# Patient Record
Sex: FEMALE | URBAN - METROPOLITAN AREA
[De-identification: names, ages, dates, MRNs, and addresses within clinical notes are randomized per-mention and may not be internally consistent; named-entity substitution may affect disease eponyms.]

---

## 2017-01-03 ENCOUNTER — OUTPATIENT (OUTPATIENT)
Dept: OUTPATIENT SERVICES | Facility: HOSPITAL | Age: 27
LOS: 1 days | End: 2017-01-03
Payer: SELF-PAY

## 2017-01-03 ENCOUNTER — MED ADMIN CHARGE (OUTPATIENT)
Age: 27
End: 2017-01-03

## 2017-01-03 ENCOUNTER — RESULT CHARGE (OUTPATIENT)
Age: 27
End: 2017-01-03

## 2017-01-03 ENCOUNTER — APPOINTMENT (OUTPATIENT)
Dept: FAMILY MEDICINE | Facility: HOSPITAL | Age: 27
End: 2017-01-03

## 2017-01-03 VITALS
HEIGHT: 60 IN | WEIGHT: 132 LBS | HEART RATE: 77 BPM | SYSTOLIC BLOOD PRESSURE: 111 MMHG | OXYGEN SATURATION: 99 % | RESPIRATION RATE: 16 BRPM | TEMPERATURE: 97.8 F | BODY MASS INDEX: 25.91 KG/M2 | DIASTOLIC BLOOD PRESSURE: 75 MMHG

## 2017-01-03 DIAGNOSIS — Z00.00 ENCOUNTER FOR GENERAL ADULT MEDICAL EXAMINATION W/OUT ABNORMAL FINDINGS: ICD-10-CM

## 2017-01-03 DIAGNOSIS — Z11.3 ENCOUNTER FOR SCREENING FOR INFECTIONS WITH A PREDOMINANTLY SEXUAL MODE OF TRANSMISSION: ICD-10-CM

## 2017-01-03 DIAGNOSIS — Z83.3 FAMILY HISTORY OF DIABETES MELLITUS: ICD-10-CM

## 2017-01-03 PROCEDURE — G0008: CPT

## 2017-01-03 PROCEDURE — 87491 CHLMYD TRACH DNA AMP PROBE: CPT

## 2017-01-03 PROCEDURE — 87591 N.GONORRHOEAE DNA AMP PROB: CPT

## 2017-01-03 PROCEDURE — G0463: CPT

## 2017-01-04 LAB
HCG UR QL: NEGATIVE
QUALITY CONTROL: YES

## 2017-01-09 ENCOUNTER — FORM ENCOUNTER (OUTPATIENT)
Age: 27
End: 2017-01-09

## 2017-01-10 ENCOUNTER — OUTPATIENT (OUTPATIENT)
Dept: OUTPATIENT SERVICES | Facility: HOSPITAL | Age: 27
LOS: 1 days | End: 2017-01-10
Payer: SELF-PAY

## 2017-01-10 PROCEDURE — 76856 US EXAM PELVIC COMPLETE: CPT | Mod: 26

## 2017-01-10 PROCEDURE — 76830 TRANSVAGINAL US NON-OB: CPT

## 2017-01-10 PROCEDURE — 76830 TRANSVAGINAL US NON-OB: CPT | Mod: 26

## 2017-01-10 PROCEDURE — 80061 LIPID PANEL: CPT

## 2017-01-10 PROCEDURE — 83036 HEMOGLOBIN GLYCOSYLATED A1C: CPT

## 2017-01-10 PROCEDURE — 87389 HIV-1 AG W/HIV-1&-2 AB AG IA: CPT

## 2017-01-10 PROCEDURE — 76856 US EXAM PELVIC COMPLETE: CPT

## 2017-01-10 PROCEDURE — 80053 COMPREHEN METABOLIC PANEL: CPT

## 2017-01-10 PROCEDURE — 36415 COLL VENOUS BLD VENIPUNCTURE: CPT

## 2017-01-20 ENCOUNTER — APPOINTMENT (OUTPATIENT)
Dept: FAMILY MEDICINE | Facility: HOSPITAL | Age: 27
End: 2017-01-20

## 2017-01-25 ENCOUNTER — RESULT REVIEW (OUTPATIENT)
Age: 27
End: 2017-01-25

## 2017-01-26 ENCOUNTER — APPOINTMENT (OUTPATIENT)
Dept: FAMILY MEDICINE | Facility: HOSPITAL | Age: 27
End: 2017-01-26

## 2017-01-26 ENCOUNTER — OUTPATIENT (OUTPATIENT)
Dept: OUTPATIENT SERVICES | Facility: HOSPITAL | Age: 27
LOS: 1 days | End: 2017-01-26
Payer: SELF-PAY

## 2017-01-26 VITALS
RESPIRATION RATE: 16 BRPM | SYSTOLIC BLOOD PRESSURE: 108 MMHG | TEMPERATURE: 97.88 F | OXYGEN SATURATION: 99 % | WEIGHT: 139 LBS | DIASTOLIC BLOOD PRESSURE: 69 MMHG | HEART RATE: 84 BPM

## 2017-01-26 PROCEDURE — 88175 CYTOPATH C/V AUTO FLUID REDO: CPT

## 2017-01-26 PROCEDURE — G0463: CPT

## 2017-02-03 ENCOUNTER — CHART COPY (OUTPATIENT)
Age: 27
End: 2017-02-03

## 2017-02-08 ENCOUNTER — APPOINTMENT (OUTPATIENT)
Dept: FAMILY MEDICINE | Facility: HOSPITAL | Age: 27
End: 2017-02-08

## 2017-02-08 VITALS
HEIGHT: 60 IN | HEART RATE: 75 BPM | BODY MASS INDEX: 27.68 KG/M2 | SYSTOLIC BLOOD PRESSURE: 109 MMHG | RESPIRATION RATE: 16 BRPM | OXYGEN SATURATION: 99 % | WEIGHT: 141 LBS | DIASTOLIC BLOOD PRESSURE: 63 MMHG | TEMPERATURE: 208.4 F

## 2017-02-08 DIAGNOSIS — Z23 ENCOUNTER FOR IMMUNIZATION: ICD-10-CM

## 2017-02-14 ENCOUNTER — RESULT REVIEW (OUTPATIENT)
Age: 27
End: 2017-02-14

## 2017-02-14 LAB — CYTOLOGY CVX/VAG DOC THIN PREP: NORMAL

## 2017-02-15 ENCOUNTER — OUTPATIENT (OUTPATIENT)
Dept: OUTPATIENT SERVICES | Facility: HOSPITAL | Age: 27
LOS: 1 days | End: 2017-02-15
Payer: SELF-PAY

## 2017-02-15 ENCOUNTER — APPOINTMENT (OUTPATIENT)
Dept: FAMILY MEDICINE | Facility: HOSPITAL | Age: 27
End: 2017-02-15

## 2017-02-15 VITALS
OXYGEN SATURATION: 98 % | BODY MASS INDEX: 27.88 KG/M2 | SYSTOLIC BLOOD PRESSURE: 107 MMHG | WEIGHT: 142 LBS | TEMPERATURE: 207.68 F | RESPIRATION RATE: 14 BRPM | HEART RATE: 74 BPM | HEIGHT: 60 IN | DIASTOLIC BLOOD PRESSURE: 70 MMHG

## 2017-02-15 DIAGNOSIS — Z97.5 PRESENCE OF (INTRAUTERINE) CONTRACEPTIVE DEVICE: ICD-10-CM

## 2017-02-15 DIAGNOSIS — Z12.4 ENCOUNTER FOR SCREENING FOR MALIGNANT NEOPLASM OF CERVIX: ICD-10-CM

## 2017-02-15 PROCEDURE — 88175 CYTOPATH C/V AUTO FLUID REDO: CPT

## 2017-02-15 PROCEDURE — G0463: CPT

## 2017-02-26 ENCOUNTER — FORM ENCOUNTER (OUTPATIENT)
Age: 27
End: 2017-02-26

## 2017-02-27 ENCOUNTER — OUTPATIENT (OUTPATIENT)
Dept: OUTPATIENT SERVICES | Facility: HOSPITAL | Age: 27
LOS: 1 days | End: 2017-02-27
Payer: SELF-PAY

## 2017-02-27 PROCEDURE — 76856 US EXAM PELVIC COMPLETE: CPT | Mod: 26

## 2017-02-27 PROCEDURE — 76856 US EXAM PELVIC COMPLETE: CPT

## 2017-02-27 PROCEDURE — 76830 TRANSVAGINAL US NON-OB: CPT | Mod: 26

## 2017-02-27 PROCEDURE — 76830 TRANSVAGINAL US NON-OB: CPT

## 2017-03-13 LAB — CYTOLOGY CVX/VAG DOC THIN PREP: NORMAL

## 2017-08-09 ENCOUNTER — APPOINTMENT (OUTPATIENT)
Dept: FAMILY MEDICINE | Facility: HOSPITAL | Age: 27
End: 2017-08-09

## 2018-03-21 ENCOUNTER — APPOINTMENT (OUTPATIENT)
Dept: ANTEPARTUM | Facility: CLINIC | Age: 28
End: 2018-03-21

## 2018-03-28 ENCOUNTER — ASOB RESULT (OUTPATIENT)
Age: 28
End: 2018-03-28

## 2018-03-28 ENCOUNTER — APPOINTMENT (OUTPATIENT)
Dept: ANTEPARTUM | Facility: CLINIC | Age: 28
End: 2018-03-28
Payer: SELF-PAY

## 2018-03-28 PROCEDURE — 76857 US EXAM PELVIC LIMITED: CPT

## 2018-03-28 PROCEDURE — 76830 TRANSVAGINAL US NON-OB: CPT

## 2018-04-18 ENCOUNTER — EMERGENCY (EMERGENCY)
Facility: HOSPITAL | Age: 28
LOS: 1 days | Discharge: HOME | End: 2018-04-18
Attending: EMERGENCY MEDICINE
Payer: MEDICAID

## 2018-04-18 VITALS
OXYGEN SATURATION: 99 % | HEART RATE: 97 BPM | DIASTOLIC BLOOD PRESSURE: 70 MMHG | SYSTOLIC BLOOD PRESSURE: 122 MMHG | RESPIRATION RATE: 16 BRPM

## 2018-04-18 VITALS — HEIGHT: 59.06 IN | WEIGHT: 149.91 LBS

## 2018-04-18 LAB
ALBUMIN SERPL ELPH-MCNC: 4.4 G/DL — SIGNIFICANT CHANGE UP (ref 3.3–5.2)
ALP SERPL-CCNC: 108 U/L — SIGNIFICANT CHANGE UP (ref 40–120)
ALT FLD-CCNC: 18 U/L — SIGNIFICANT CHANGE UP
ANION GAP SERPL CALC-SCNC: 14 MMOL/L — SIGNIFICANT CHANGE UP (ref 5–17)
APPEARANCE UR: CLEAR — SIGNIFICANT CHANGE UP
AST SERPL-CCNC: 23 U/L — SIGNIFICANT CHANGE UP
BACTERIA # UR AUTO: ABNORMAL
BILIRUB SERPL-MCNC: 0.4 MG/DL — SIGNIFICANT CHANGE UP (ref 0.4–2)
BILIRUB UR-MCNC: NEGATIVE — SIGNIFICANT CHANGE UP
BUN SERPL-MCNC: 10 MG/DL — SIGNIFICANT CHANGE UP (ref 8–20)
CALCIUM SERPL-MCNC: 9.7 MG/DL — SIGNIFICANT CHANGE UP (ref 8.6–10.2)
CHLORIDE SERPL-SCNC: 97 MMOL/L — LOW (ref 98–107)
CO2 SERPL-SCNC: 26 MMOL/L — SIGNIFICANT CHANGE UP (ref 22–29)
COLOR SPEC: YELLOW — SIGNIFICANT CHANGE UP
CREAT SERPL-MCNC: 0.79 MG/DL — SIGNIFICANT CHANGE UP (ref 0.5–1.3)
DIFF PNL FLD: ABNORMAL
EPI CELLS # UR: SIGNIFICANT CHANGE UP
GLUCOSE SERPL-MCNC: 102 MG/DL — SIGNIFICANT CHANGE UP (ref 70–115)
GLUCOSE UR QL: NEGATIVE MG/DL — SIGNIFICANT CHANGE UP
HCG UR QL: NEGATIVE — SIGNIFICANT CHANGE UP
HCT VFR BLD CALC: 39.8 % — SIGNIFICANT CHANGE UP (ref 37–47)
HGB BLD-MCNC: 13 G/DL — SIGNIFICANT CHANGE UP (ref 12–16)
KETONES UR-MCNC: NEGATIVE — SIGNIFICANT CHANGE UP
LEUKOCYTE ESTERASE UR-ACNC: ABNORMAL
MCHC RBC-ENTMCNC: 28.6 PG — SIGNIFICANT CHANGE UP (ref 27–31)
MCHC RBC-ENTMCNC: 32.7 G/DL — SIGNIFICANT CHANGE UP (ref 32–36)
MCV RBC AUTO: 87.5 FL — SIGNIFICANT CHANGE UP (ref 81–99)
NITRITE UR-MCNC: NEGATIVE — SIGNIFICANT CHANGE UP
PH UR: 6.5 — SIGNIFICANT CHANGE UP (ref 5–8)
PLATELET # BLD AUTO: 250 K/UL — SIGNIFICANT CHANGE UP (ref 150–400)
POTASSIUM SERPL-MCNC: 3.8 MMOL/L — SIGNIFICANT CHANGE UP (ref 3.5–5.3)
POTASSIUM SERPL-SCNC: 3.8 MMOL/L — SIGNIFICANT CHANGE UP (ref 3.5–5.3)
PROT SERPL-MCNC: 8.5 G/DL — SIGNIFICANT CHANGE UP (ref 6.6–8.7)
PROT UR-MCNC: NEGATIVE MG/DL — SIGNIFICANT CHANGE UP
RBC # BLD: 4.55 M/UL — SIGNIFICANT CHANGE UP (ref 4.4–5.2)
RBC # FLD: 14.1 % — SIGNIFICANT CHANGE UP (ref 11–15.6)
RBC CASTS # UR COMP ASSIST: SIGNIFICANT CHANGE UP /HPF (ref 0–4)
SODIUM SERPL-SCNC: 137 MMOL/L — SIGNIFICANT CHANGE UP (ref 135–145)
SP GR SPEC: 1 — LOW (ref 1.01–1.02)
UROBILINOGEN FLD QL: NEGATIVE MG/DL — SIGNIFICANT CHANGE UP
WBC # BLD: 6.2 K/UL — SIGNIFICANT CHANGE UP (ref 4.8–10.8)
WBC # FLD AUTO: 6.2 K/UL — SIGNIFICANT CHANGE UP (ref 4.8–10.8)
WBC UR QL: SIGNIFICANT CHANGE UP

## 2018-04-18 PROCEDURE — 99284 EMERGENCY DEPT VISIT MOD MDM: CPT

## 2018-04-18 PROCEDURE — 99283 EMERGENCY DEPT VISIT LOW MDM: CPT

## 2018-04-18 PROCEDURE — 36415 COLL VENOUS BLD VENIPUNCTURE: CPT

## 2018-04-18 PROCEDURE — 81001 URINALYSIS AUTO W/SCOPE: CPT

## 2018-04-18 PROCEDURE — 85027 COMPLETE CBC AUTOMATED: CPT

## 2018-04-18 PROCEDURE — 81025 URINE PREGNANCY TEST: CPT

## 2018-04-18 PROCEDURE — 80053 COMPREHEN METABOLIC PANEL: CPT

## 2018-04-18 PROCEDURE — T1013: CPT

## 2018-04-18 PROCEDURE — 87086 URINE CULTURE/COLONY COUNT: CPT

## 2018-04-18 RX ORDER — ONDANSETRON 8 MG/1
4 TABLET, FILM COATED ORAL ONCE
Qty: 0 | Refills: 0 | Status: COMPLETED | OUTPATIENT
Start: 2018-04-18 | End: 2018-04-18

## 2018-04-18 RX ORDER — SODIUM CHLORIDE 9 MG/ML
1000 INJECTION INTRAMUSCULAR; INTRAVENOUS; SUBCUTANEOUS ONCE
Qty: 0 | Refills: 0 | Status: COMPLETED | OUTPATIENT
Start: 2018-04-18 | End: 2018-04-18

## 2018-04-18 RX ORDER — MECLIZINE HCL 12.5 MG
25 TABLET ORAL ONCE
Qty: 0 | Refills: 0 | Status: COMPLETED | OUTPATIENT
Start: 2018-04-18 | End: 2018-04-18

## 2018-04-18 RX ORDER — IBUPROFEN 200 MG
1 TABLET ORAL
Qty: 30 | Refills: 0 | OUTPATIENT
Start: 2018-04-18 | End: 2018-04-22

## 2018-04-18 RX ADMIN — Medication 25 MILLIGRAM(S): at 15:22

## 2018-04-18 RX ADMIN — SODIUM CHLORIDE 1000 MILLILITER(S): 9 INJECTION INTRAMUSCULAR; INTRAVENOUS; SUBCUTANEOUS at 15:22

## 2018-04-18 RX ADMIN — ONDANSETRON 4 MILLIGRAM(S): 8 TABLET, FILM COATED ORAL at 15:22

## 2018-04-18 NOTE — ED ADULT NURSE NOTE - OBJECTIVE STATEMENT
Assumed pt care at 1440.  Pt awake, alert and oriented x3 c/o headache, nausea, dizziness and pain upon closing eyes.  Pt states she may be pregnant.  Abdomen soft, nontender, nondistended.  Skin warm and dry.  Neuro intact.  SIERRA well and with purpose.

## 2018-04-18 NOTE — ED PROVIDER NOTE - CARE PLAN
Assessment and plan of treatment:	evaluation of HA, dizziness,   UA, Urine pregnancy, CBC, Fluids, meclizine, zofran  reassess. Principal Discharge DX:	Acute nonintractable headache, unspecified headache type  Assessment and plan of treatment:	evaluation of HA, dizziness,   UA, Urine pregnancy, CBC, Fluids, meclizine, zofran  reassess.

## 2018-04-18 NOTE — ED ADULT TRIAGE NOTE - CHIEF COMPLAINT QUOTE
Pt c/o HA and dizziness since yesterday. Reports subjective fevers. Denies any sick contacts at home.

## 2018-04-18 NOTE — ED PROVIDER NOTE - ENMT, MLM
I want to leave Airway patent, Nasal mucosa clear. Mouth with normal mucosa. Throat has no vesicles, no oropharyngeal exudates and uvula is midline.

## 2018-04-20 LAB
CULTURE RESULTS: SIGNIFICANT CHANGE UP
SPECIMEN SOURCE: SIGNIFICANT CHANGE UP

## 2018-12-01 NOTE — ED PROVIDER NOTE - OBJECTIVE STATEMENT
27 year old female with a hx of preeclampsia in pregnancy presenting with HA, dizziness, and chills (subjective fever, no actual temperature taken). HA described as a strong pain to the back of her head, behind her ears and her eyes. HA is associated with a dizziness that she describes as occurring when she blinks her eyes and as the room spinning. dizziness makes her nauseous but she denies vomiting. pt states that she has blurry vision, denies double vision Pt states that she had similar symptoms when she had preeclampsia. denies recent sick contacts, recent illness, abdominal pains, dysuria, hematuria, vomiting, diarrhea. LMP in the beginning of march (exact date unknown). PT states that she had an IUD placed in 2016 that is placed incorrectly and that there could be a chance that she is pregnant.
Yes

## 2019-07-29 PROBLEM — O14.90 UNSPECIFIED PRE-ECLAMPSIA, UNSPECIFIED TRIMESTER: Chronic | Status: ACTIVE | Noted: 2018-04-18

## 2019-08-02 ENCOUNTER — APPOINTMENT (OUTPATIENT)
Dept: ORTHOPEDIC SURGERY | Facility: CLINIC | Age: 29
End: 2019-08-02

## 2019-08-28 ENCOUNTER — APPOINTMENT (OUTPATIENT)
Age: 29
End: 2019-08-28
Payer: COMMERCIAL

## 2019-08-28 VITALS — HEART RATE: 64 BPM | DIASTOLIC BLOOD PRESSURE: 70 MMHG | SYSTOLIC BLOOD PRESSURE: 107 MMHG

## 2019-08-28 DIAGNOSIS — M75.41 IMPINGEMENT SYNDROME OF RIGHT SHOULDER: ICD-10-CM

## 2019-08-28 DIAGNOSIS — M75.81 OTHER SHOULDER LESIONS, RIGHT SHOULDER: ICD-10-CM

## 2019-08-28 PROCEDURE — 99203 OFFICE O/P NEW LOW 30 MIN: CPT

## 2019-08-28 PROCEDURE — 73030 X-RAY EXAM OF SHOULDER: CPT | Mod: RT

## 2019-08-28 NOTE — CONSULT LETTER
[Dear  ___] : Dear  [unfilled], [Consult Letter:] : I had the pleasure of evaluating your patient, [unfilled]. [Please see my note below.] : Please see my note below. [Consult Closing:] : Thank you very much for allowing me to participate in the care of this patient.  If you have any questions, please do not hesitate to contact me. [Sincerely,] : Sincerely, [FreeTextEntry2] : Ivy Beasley MD [FreeTextEntry3] : Ry Le DO.\par Sports Medicine \par \par Orthopaedic Surgery\par \par [facility] [place] [facility]\par \par [address] \par [address]\[place] [address]\par \par [address] [facility] \[address] \[place] [address]\par \par Tel [phone]\par Fax [phone]\par \par

## 2019-08-28 NOTE — REASON FOR VISIT
[Initial Visit] : an initial visit for [[employer] ] : provided by [employer]   [TWNoteComboBox1] : Italian [FreeTextEntry2] : Sanchez

## 2019-08-28 NOTE — PHYSICAL EXAM
[de-identified] : Physical Exam:\par General: Well appearing, no acute distress, A&O\par Neurologic: A&Ox3, No focal deficits\par Head: NCAT without abrasions, lacerations, or ecchymosis to head, face, or scalp\par Eyes: No scleral icterus, no gross abnormalities\par Respiratory: Equal chest wall expansion bilaterally, no accessory muscle use\par Lymphatic: No lymphadenopathy palpated\par Skin: Warm and dry\par Psychiatric: Normal mood and affect\par Right Shoulder\par ·	Inspection/Palpation: no tenderness, swelling or deformities\par ·	Range of Motion: no crepitus with ROM; Active ; ER at side 35; IR to L1; Passive FF same\par ·	Strength: forward elevation in scapular plane [4/5], internal rotation [4/5], external rotation [4/5], adduction [4/5] and abduction [4/5]\par ·	Stability: no joint instability on provocative testing\par ·	Tests: Elaine test positive, Neer positive, positive drop arm test secondary to pain, bear hug test positive, Napolean sign negative, cross arm adduction negative, lift off sign positive, hornblowers sign negative, speeds test negative, Yergason's test negative, no bicipital groove tenderness, Noel's Active Compression test negative, whipple test if, bicep's load II test negative\par \par Left Shoulder\par ·	Inspection/Palpation: no tenderness, swelling or deformities\par ·	Range of Motion: full and painless in all planes, no crepitus\par ·	Strength: forward elevation in scapular plane 5/5, internal rotation 5/5, external rotation 5/5, adduction 5/5 and abduction 5/5\par ·	Stability: no joint instability on provocative testing\par Tests: Elaine test negative, Neer sign negative, negative drop arm test secondary to pain, bear hug test negative, Napolean sign negative, cross arm adduction negative, lift off sign positive, hornblowers sign negative, speeds test negative, Yergason's test negative, no bicipital groove tenderness, Noel's Active Compression test negative [de-identified] : X-rays of the right shoulder were performed today and available for me to review. 4 views of the right shoulder demonstrate no fracture or dislocation. [No] glenohumeral degenerative changes noted. [No] AC joint degenerative changes noted. No gross deformities noted.

## 2019-08-28 NOTE — DISCUSSION/SUMMARY
[de-identified] : The patient presents with acute on chronic right  shoulder pain for one year. The patient has tried conservative measures of treatment including rest. The patient has positive  testing on clinical evaluation that  consistent with impingement, rotator cuff tendinitis, possible radiculopathy. I discussed the role of the rotator cuff in shoulder function including dynamic stability and range of motion, as well as pathology that causes shoulder pain including the proximal biceps tendon, subacromial impingement, bursitis and rotator cuff dysfunction/tendinopathy. On clinical evaluation, I believe that the patient's primary concern is the rotator cuff tendinitis.\par \par \par \par Nonoperative modalities of treatment include physical therapy for range of motion therapy, rotator cuff strengthening/scapular stabilization, NSAID's (if able), and subacromial corticosteroid injection. Surgical intervention would include arthroscopic rotator cuff repair for tears that are not amenable to surgical nonoperative treatment or fail a trial of nonoperative management.\par \par Considering the patient's presentation, I've recommended a trial of nonoperative treatment that includes meloxicam and physical therapy. I gave her a prescription for meloxicam once daily for 21 days and if should her for physical therapy. I also gave her a note that she may continue light duty for the next 4 weeks.. I will see the patient back in 6 weeks to determine if there is any further improvement. At that time if there is no improvement, we'll consider an MRI. We'll see the patient back at that time. If they have any questions or concerns, I can be available at any time for further discussion.\par \par

## 2019-08-28 NOTE — HISTORY OF PRESENT ILLNESS
[2] : a current pain level of 2/10 [de-identified] : LINDSAY is a 28 year female who presents today with right shoulder pain.  Her pain began approximately one year ago.  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  She denies injury or trauma to the UE.  Patient saw her PCP regarding her right shoulder pain and she recommended that the patient see an orthopedist.  Currently, she has pain to the entire shoulder and upper trapezius.  Pain radiates to her elbow.  She endorses weakness and pain if she is in one position for a prolonged period of time.  she denies numbness/tingling to the UE.\par

## 2019-09-04 ENCOUNTER — APPOINTMENT (OUTPATIENT)
Dept: OBGYN | Facility: CLINIC | Age: 29
End: 2019-09-04

## 2019-10-14 ENCOUNTER — APPOINTMENT (OUTPATIENT)
Dept: ORTHOPEDIC SURGERY | Facility: CLINIC | Age: 29
End: 2019-10-14

## 2020-04-30 NOTE — REVIEW OF SYSTEMS
none [Joint Pain] : joint pain [Depression] : depression [Sleep Disturbances] : ~T sleep disturbances [FreeTextEntry9] : right hsoulder

## 2021-07-14 ENCOUNTER — APPOINTMENT (OUTPATIENT)
Dept: ORTHOPEDIC SURGERY | Facility: CLINIC | Age: 31
End: 2021-07-14

## 2021-07-30 ENCOUNTER — APPOINTMENT (OUTPATIENT)
Dept: ORTHOPEDIC SURGERY | Facility: CLINIC | Age: 31
End: 2021-07-30
Payer: COMMERCIAL

## 2021-07-30 VITALS — HEIGHT: 60 IN | WEIGHT: 142 LBS | BODY MASS INDEX: 27.88 KG/M2

## 2021-07-30 DIAGNOSIS — M54.5 LOW BACK PAIN: ICD-10-CM

## 2021-07-30 PROCEDURE — 99214 OFFICE O/P EST MOD 30 MIN: CPT

## 2021-07-30 NOTE — PHYSICAL EXAM
[Poor Appearance] : well-appearing [Acute Distress] : not in acute distress [Obese] : not obese [de-identified] : CONSTITUTIONAL: The patient is a very pleasant individual who is well-nourished and who appears stated age.\par PSYCHIATRIC: The patient is alert and oriented X 3 and in no apparent distress, and participates with orthopedic evaluation well.\par HEAD: Atraumatic and is nonsyndromic in appearance.\par EENT: No visible thyromegaly, EOMI.\par RESPIRATORY: Respiratory rate is regular, not dyspneic on examination.\par LYMPHATICS: There is no inguinal lymphadenopathy\par INTEGUMENTARY: Skin is clean, dry, and intact about the bilateral lower extremities and lumbar spine.\par VASCULAR: There is brisk capillary refill about the bilateral lower extremities.\par NEUROLOGIC: There are no pathologic reflexes. There is no decrease in sensation of the bilateral lower extremities on Wartenberg pinwheel examination. Deep tendon reflexes are well maintained at 2+/4 of the bilateral lower extremities and are symmetric.\par MUSCULOSKELETAL: There is no visible muscular atrophy. Manual motor strength is well maintained in the bilateral lower extremities. Range of motion of lumbar spine is well maintained. The patient ambulates in a non-myelopathic manner. Negative tension sign and straight leg raise bilaterally. Quad extension, ankle dorsiflexion, EHL, plantar flexion, and ankle eversion are well preserved. Normal secondary orthopaedic exam of bilateral hips, greater trochanteric area, knees and ankles. lumbar myositis.  [de-identified] : No Xrays today as patient states she believes she may be pregnant. \par \par  X-rays of the right shoulder were performed 08- and available for me to review. 4 views of the right shoulder demonstrate no fracture or dislocation. [No] glenohumeral degenerative changes noted. [No] AC joint degenerative changes noted. No gross deformities noted.

## 2021-07-30 NOTE — ADDENDUM
[FreeTextEntry1] : Documented by Supa Dickson acting as a scribe for Amy Quezada on 07/30/2021 . All medical record entries made by the Scribe were at my, Amy Quezada, direction and personally dictated by me on 07/30/2021  . I have reviewed the chart and agree that the record accurately reflects my personal performance of the history, physical exam, assessment and plan. I have also personally directed, reviewed, and agreed with the chart.

## 2021-07-30 NOTE — DISCUSSION/SUMMARY
[de-identified] : We talked about the nature of the condition and treatment options. Anticipatory guidance regarding mechanically oriented lower back pain and myositis was given. Use of Tylenol 500 MG 2-3 times daily / PRN was advised. Patient has been referred to physical therapy for decreased pain modalities, stretching and strengthening modalities, soft tissue modalities, and physical modalities. The patient will follow up in 4 - 6 weeks for a repeat clinical assessment If pain persists at this point we will consider ordering an MRI to further assess these complaints. \par \par Prior to appointment and during encounter with patient extensive medical records were reviewed including but not limited to, hospital records, out patient records, imaging results, and lab data. During this appointment the patient was examined, diagnoses were discussed and explained in a face to face manner. In addition extensive time was spent reviewing aforementioned diagnostic studies. Counseling including abnormal image results, differential diagnoses, treatment options, risk and benefits, lifestyle changes, current condition, and current medications was performed. Patient's comments, questions, and concerns were address and patient verbalized understanding. Based on this patient's presentation at our office, which is an orthopedic spine surgeon's office, this patient inherently / intrinsically has a risk, however minute, of developing  issues such as Cauda equina syndrome, bowel and bladder changes, or progression of motor or neurological deficits such as paralysis which may be permanent.

## 2021-07-30 NOTE — HISTORY OF PRESENT ILLNESS
[de-identified] : 30 year old F presents for an initial evaluation of lower back pain radiating into the buttock region. She has had no conservative care. Patient has seen Hand and UE specialists for her right shoulder pain, she was provided a Medrol pack for these complaints.  [Ataxia] : no ataxia [Incontinence] : no incontinence [Loss of Dexterity] : good dexterity [Urinary Ret.] : no urinary retention

## 2021-08-25 ENCOUNTER — APPOINTMENT (OUTPATIENT)
Dept: ORTHOPEDIC SURGERY | Facility: CLINIC | Age: 31
End: 2021-08-25

## 2021-11-01 ENCOUNTER — APPOINTMENT (OUTPATIENT)
Dept: FAMILY MEDICINE | Facility: CLINIC | Age: 31
End: 2021-11-01

## 2021-11-17 ENCOUNTER — APPOINTMENT (OUTPATIENT)
Dept: FAMILY MEDICINE | Facility: CLINIC | Age: 31
End: 2021-11-17

## 2022-04-01 ENCOUNTER — APPOINTMENT (OUTPATIENT)
Dept: ORTHOPEDIC SURGERY | Facility: CLINIC | Age: 32
End: 2022-04-01
Payer: MEDICAID

## 2022-04-01 VITALS
HEART RATE: 72 BPM | SYSTOLIC BLOOD PRESSURE: 111 MMHG | BODY MASS INDEX: 31.41 KG/M2 | WEIGHT: 160 LBS | HEIGHT: 60 IN | DIASTOLIC BLOOD PRESSURE: 74 MMHG

## 2022-04-01 DIAGNOSIS — M25.511 PAIN IN RIGHT SHOULDER: ICD-10-CM

## 2022-04-01 PROCEDURE — 99213 OFFICE O/P EST LOW 20 MIN: CPT

## 2022-04-01 PROCEDURE — 73030 X-RAY EXAM OF SHOULDER: CPT | Mod: RT

## 2022-04-01 NOTE — HISTORY OF PRESENT ILLNESS
[Pain Location] : pain [] : right shoulder [Worsening] : worsening [Lifting] : lifting [Intermit.] : ~He/She~ states the symptoms seem to be intermittent [de-identified] : 4/1/2022: Yulia is a 31-year-old right-hand-dominant female who presents to the office today complaining of chronic right shoulder pain.  Patient states that her pain began many years ago but she denies any history of trauma.  The pain is located at her right shoulder and also extends up into the right side of her neck as well as down into her right chest.  The pain is primarily activity related and alleviated by rest but also bothers her to lay on her right side.  She has seen Dr. Le in the past who recommended conservative treatments for rotator cuff/biceps tendinitis and bursitis.  Patient underwent physical therapy which did not provide her with any lasting relief.  She takes occasional NSAIDs over-the-counter which also are not very helpful.  She presents today seeking further treatment advice.  The patient denies any fevers, chills, sweats, recent illnesses, numbness, tingling, weakness, or pain elsewhere at this time.

## 2022-04-01 NOTE — DISCUSSION/SUMMARY
[de-identified] : Assessment: 31-year-old female with right shoulder pain secondary to AC joint synovitis\par \par Plan: I had a long discussion with the patient today regarding the nature of their diagnosis and treatment plan. We discussed the risks and benefits of no treatment as well as nonoperative and operative treatments.  I reviewed the patient's x-rays today with her in the office which do demonstrate a very small calcium deposit at the greater tuberosity.  Her examination is most consistent with AC joint pain given that she has significant tenderness over that area.  She also has muscle spasm in the neck and trapezius muscles.  The patient has undergone extensive conservative treatment for this issue in the past including physical therapy which did not provide her with any relief.  I recommend an MRI to further evaluate her shoulders to rule out any internal derangement.  She may continue to treat herself symptomatically on her own at home.  Prescriptions for a Medrol Dosepak as well as meloxicam were sent to her pharmacy today.  I reviewed the risks and benefits associated with these medicines.  Recommend follow-up after the MRI to discuss the results in person and and any further recommendations.\par \par The patient verbalizes their understanding and agrees with the plan.  All questions were answered to their satisfaction.

## 2022-04-01 NOTE — CONSULT LETTER
[Dear  ___] : Dear  [unfilled], [Consult Letter:] : I had the pleasure of evaluating your patient, [unfilled]. [( Thank you for referring [unfilled] for consultation for _____ )] : Thank you for referring [unfilled] for consultation for [unfilled] [Please see my note below.] : Please see my note below. [Consult Closing:] : Thank you very much for allowing me to participate in the care of this patient.  If you have any questions, please do not hesitate to contact me. [Sincerely,] : Sincerely, [FreeTextEntry2] : PASCUAL MINAYA\par  [FreeTextEntry3] : Finn Giles DO.\par Sports Medicine \par Orthopaedic Surgery\par \par [facility] [place]\par [facility] \par [address] \[address] \[place] [address]\par \par Tel [phone]\par Fax [phone]\par \par For same day and next day orthopedic appointments contact:\par [email or web address] |[phone])\par Appointments available nights and weekends!  \par \par [facility] [place]\par Visit us at [facility]/orthopaedic\par

## 2022-04-01 NOTE — REASON FOR VISIT
[Initial Visit] : an initial visit for [[employer] ] : provided by [employer]   [FreeTextEntry2] : Right shoulder pain  [Interpreters_IDNumber] : [id number] [Interpreters_FullName] : [place]ley [TWNoteComboBox1] : Bermud[place]

## 2022-04-01 NOTE — PHYSICAL EXAM
[de-identified] : General:\par Awake, alert, no acute distress, Patient was cooperative and appropriate during the examination.\par \par The patient is overweight for height and age.\par \par Walks without an antalgic gait.\par \par Full, painless range of motion of the neck and back.\par \par Exam of the bilateral lower extremities is intact and symmetric with regards to dermatologic, vascular, and neurologic exam. Bilateral lower extremity sensation is grossly intact to light touch in the DP/SP/T/S/S nerve distributions. Intact DF/PF/EHL. BIlateral lower extremities warm and well-perfused with brisk capillary refill.\par \par \par Pulmonary:\par Regular, nonlabored breathing\par \par Abdomen:\par Soft, nontender, nondistended.\par \par Lymphatic:\par No evidence of axillary lymphadenopathy\par \par Right shoulder Exam:\par Physical exam of the shoulder demonstrates normal skin without signs of skin changes or abnormalities. No erythema, warmth, or joint effusion appreciated. \par \par Sensation intact light touch C5-T1\par Palpable radial pulse\par Radial/ulnar/median/axillary/musculocutaneous/AIN/PIN nerves grossly intact\par \par Range of motion:\par Forward Flexion: 175\par Abduction: 150\par External Rotation: 45\par Internal Rotation: L3\par \par Palpation:\par Not tender to palpation over the glenohumeral joint\par Mildly tender palpation over the rotator cuff insertion on the greater tuberosity\par Exquisitely tender to palpation over the AC joint\par Mildly tender to palpation of the biceps tendon/bicipital groove\par Moderately tender to palpation about the right-sided paraspinal and upper trapezial musculature with associated spasm\par \par Strength testing:\par Supraspinatus: 5/5\par Infraspinatus: 5/5\par Subscapularis: 5/5\par \par Special test:\par Empty can test mildly positive\par Elaine impingement test positive\par Speeds test negative\par Lewis's test negative\par Lift-off test negative\par Bell-press test negative\par Cross-arm adduction test positive\par \par  [de-identified] : X-rays including 4 views of the right shoulder were obtained in the office and reviewed the patient today on 4/1/2022.  There is no acute fracture or dislocation.  There is no significant arthritis.  Patient has a very small calcium deposit off the greater tuberosity.

## 2022-04-13 ENCOUNTER — APPOINTMENT (OUTPATIENT)
Dept: MRI IMAGING | Facility: CLINIC | Age: 32
End: 2022-04-13
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
## 2023-02-04 ENCOUNTER — EMERGENCY (EMERGENCY)
Facility: HOSPITAL | Age: 33
LOS: 1 days | Discharge: HOME | End: 2023-02-04
Attending: EMERGENCY MEDICINE
Payer: COMMERCIAL

## 2023-02-04 VITALS
HEART RATE: 94 BPM | WEIGHT: 179.9 LBS | HEIGHT: 63 IN | DIASTOLIC BLOOD PRESSURE: 84 MMHG | OXYGEN SATURATION: 98 % | SYSTOLIC BLOOD PRESSURE: 123 MMHG | RESPIRATION RATE: 18 BRPM

## 2023-02-04 PROCEDURE — 73562 X-RAY EXAM OF KNEE 3: CPT

## 2023-02-04 PROCEDURE — 99284 EMERGENCY DEPT VISIT MOD MDM: CPT | Mod: 25

## 2023-02-04 PROCEDURE — 93010 ELECTROCARDIOGRAM REPORT: CPT

## 2023-02-04 PROCEDURE — 73130 X-RAY EXAM OF HAND: CPT

## 2023-02-04 PROCEDURE — 12002 RPR S/N/AX/GEN/TRNK2.6-7.5CM: CPT

## 2023-02-04 PROCEDURE — 90471 IMMUNIZATION ADMIN: CPT

## 2023-02-04 PROCEDURE — 73562 X-RAY EXAM OF KNEE 3: CPT | Mod: 26,LT

## 2023-02-04 PROCEDURE — 71046 X-RAY EXAM CHEST 2 VIEWS: CPT

## 2023-02-04 PROCEDURE — 73130 X-RAY EXAM OF HAND: CPT | Mod: 26,RT

## 2023-02-04 PROCEDURE — 71046 X-RAY EXAM CHEST 2 VIEWS: CPT | Mod: 26

## 2023-02-04 PROCEDURE — 93005 ELECTROCARDIOGRAM TRACING: CPT

## 2023-02-04 PROCEDURE — 90715 TDAP VACCINE 7 YRS/> IM: CPT

## 2023-02-04 RX ORDER — LIDOCAINE HCL 20 MG/ML
6 VIAL (ML) INJECTION ONCE
Refills: 0 | Status: COMPLETED | OUTPATIENT
Start: 2023-02-04 | End: 2023-02-04

## 2023-02-04 RX ORDER — TETANUS TOXOID, REDUCED DIPHTHERIA TOXOID AND ACELLULAR PERTUSSIS VACCINE, ADSORBED 5; 2.5; 8; 8; 2.5 [IU]/.5ML; [IU]/.5ML; UG/.5ML; UG/.5ML; UG/.5ML
0.5 SUSPENSION INTRAMUSCULAR ONCE
Refills: 0 | Status: COMPLETED | OUTPATIENT
Start: 2023-02-04 | End: 2023-02-04

## 2023-02-04 RX ORDER — METHOCARBAMOL 500 MG/1
2 TABLET, FILM COATED ORAL
Qty: 18 | Refills: 0
Start: 2023-02-04 | End: 2023-02-06

## 2023-02-04 RX ORDER — ACETAMINOPHEN 500 MG
650 TABLET ORAL ONCE
Refills: 0 | Status: COMPLETED | OUTPATIENT
Start: 2023-02-04 | End: 2023-02-04

## 2023-02-04 RX ORDER — IBUPROFEN 200 MG
600 TABLET ORAL ONCE
Refills: 0 | Status: COMPLETED | OUTPATIENT
Start: 2023-02-04 | End: 2023-02-04

## 2023-02-04 RX ADMIN — Medication 6 MILLILITER(S): at 18:19

## 2023-02-04 RX ADMIN — Medication 600 MILLIGRAM(S): at 18:21

## 2023-02-04 RX ADMIN — TETANUS TOXOID, REDUCED DIPHTHERIA TOXOID AND ACELLULAR PERTUSSIS VACCINE, ADSORBED 0.5 MILLILITER(S): 5; 2.5; 8; 8; 2.5 SUSPENSION INTRAMUSCULAR at 18:22

## 2023-02-04 RX ADMIN — Medication 650 MILLIGRAM(S): at 18:48
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
## 2023-02-04 NOTE — ED PROVIDER NOTE - NS ED ATTENDING STATEMENT MOD
Detail Level: Zone
This was a shared visit with the AYANNA. I reviewed and verified the documentation and independently performed the documented:
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
## 2023-02-04 NOTE — ED PROVIDER NOTE - PATIENT PORTAL LINK FT
You can access the FollowMyHealth Patient Portal offered by [facility] by registering at the following website: [email or web address]. By joining [facility]’s FollowMyHealth portal, you will also be able to view your health information using other applications (apps) compatible with our system.
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
## 2023-02-06 ENCOUNTER — OFFICE (OUTPATIENT)
Dept: URBAN - METROPOLITAN AREA CLINIC 104 | Facility: CLINIC | Age: 33
Setting detail: OPHTHALMOLOGY
End: 2023-02-06
Payer: COMMERCIAL

## 2023-02-06 DIAGNOSIS — H01.005: ICD-10-CM

## 2023-02-06 DIAGNOSIS — H01.002: ICD-10-CM

## 2023-02-06 DIAGNOSIS — H52.03: ICD-10-CM

## 2023-02-06 PROCEDURE — 92015 DETERMINE REFRACTIVE STATE: CPT | Performed by: SPECIALIST

## 2023-02-06 PROCEDURE — 92014 COMPRE OPH EXAM EST PT 1/>: CPT | Performed by: SPECIALIST

## 2023-02-06 ASSESSMENT — SPHEQUIV_DERIVED
OD_SPHEQUIV: -1.25
OS_SPHEQUIV: -1.375
OS_SPHEQUIV: -0.75
OD_SPHEQUIV: -1.125

## 2023-02-06 ASSESSMENT — REFRACTION_CURRENTRX
OS_SPHERE: PLANO
OD_OVR_VA: 20/
OS_AXIS: 012
OD_SPHERE: -0.25
OS_CYLINDER: -2.75
OD_CYLINDER: -2.75
OD_AXIS: 001
OS_OVR_VA: 20/

## 2023-02-06 ASSESSMENT — REFRACTION_AUTOREFRACTION
OS_SPHERE: +1.00
OD_SPHERE: +0.50
OD_AXIS: 171
OS_CYLINDER: -3.50
OS_AXIS: 009
OD_CYLINDER: -3.25

## 2023-02-06 ASSESSMENT — VISUAL ACUITY
OD_BCVA: 20/20-2
OS_BCVA: 20/20-1

## 2023-02-06 ASSESSMENT — REFRACTION_MANIFEST
OS_VA1: 20/20
OD_VA1: 20/20
OD_AXIS: 170
OS_SPHERE: +0.25
OD_CYLINDER: -3.00
OD_SPHERE: +0.25
OS_AXIS: 010
OS_CYLINDER: -3.25

## 2023-02-06 ASSESSMENT — CONFRONTATIONAL VISUAL FIELD TEST (CVF)
OS_FINDINGS: FULL
OD_FINDINGS: FULL

## 2023-02-06 ASSESSMENT — LID EXAM ASSESSMENTS
OS_BLEPHARITIS: LLL 1+
OD_BLEPHARITIS: RLL 1+

## 2023-02-06 ASSESSMENT — TONOMETRY
OS_IOP_MMHG: 16
OD_IOP_MMHG: 16

## 2023-03-08 ENCOUNTER — APPOINTMENT (OUTPATIENT)
Dept: ENDOCRINOLOGY | Facility: CLINIC | Age: 33
End: 2023-03-08
Payer: MEDICAID

## 2023-03-08 VITALS
WEIGHT: 150 LBS | DIASTOLIC BLOOD PRESSURE: 60 MMHG | SYSTOLIC BLOOD PRESSURE: 116 MMHG | HEIGHT: 60 IN | BODY MASS INDEX: 29.45 KG/M2 | HEART RATE: 70 BPM

## 2023-03-08 PROCEDURE — 99204 OFFICE O/P NEW MOD 45 MIN: CPT

## 2023-03-08 RX ORDER — MELOXICAM 15 MG/1
15 TABLET ORAL
Qty: 21 | Refills: 0 | Status: DISCONTINUED | COMMUNITY
Start: 2022-04-01 | End: 2023-03-08

## 2023-03-08 RX ORDER — MELOXICAM 7.5 MG/1
7.5 TABLET ORAL DAILY
Qty: 21 | Refills: 1 | Status: DISCONTINUED | COMMUNITY
Start: 2019-08-28 | End: 2023-03-08

## 2023-03-08 RX ORDER — METHYLPREDNISOLONE 4 MG/1
4 TABLET ORAL
Qty: 1 | Refills: 0 | Status: DISCONTINUED | COMMUNITY
Start: 2022-04-01 | End: 2023-03-08

## 2023-03-23 ENCOUNTER — APPOINTMENT (OUTPATIENT)
Dept: ULTRASOUND IMAGING | Facility: CLINIC | Age: 33
End: 2023-03-23
Payer: MEDICAID

## 2023-03-23 ENCOUNTER — OUTPATIENT (OUTPATIENT)
Dept: OUTPATIENT SERVICES | Facility: HOSPITAL | Age: 33
LOS: 1 days | End: 2023-03-23
Payer: MEDICAID

## 2023-03-23 DIAGNOSIS — R22.1 LOCALIZED SWELLING, MASS AND LUMP, NECK: ICD-10-CM

## 2023-03-23 LAB
ANION GAP SERPL CALC-SCNC: 12 MMOL/L
BUN SERPL-MCNC: 12 MG/DL
CALCIUM SERPL-MCNC: 9.5 MG/DL
CHLORIDE SERPL-SCNC: 103 MMOL/L
CO2 SERPL-SCNC: 24 MMOL/L
CREAT SERPL-MCNC: 0.73 MG/DL
EGFR: 113 ML/MIN/1.73M2
ESTIMATED AVERAGE GLUCOSE: 105 MG/DL
GLUCOSE SERPL-MCNC: 92 MG/DL
HBA1C MFR BLD HPLC: 5.3 %
POTASSIUM SERPL-SCNC: 4.4 MMOL/L
SODIUM SERPL-SCNC: 139 MMOL/L
T3 SERPL-MCNC: 124 NG/DL
T4 FREE SERPL-MCNC: 1.2 NG/DL
THYROGLOB AB SERPL-ACNC: <20 IU/ML
THYROPEROXIDASE AB SERPL IA-ACNC: 335 IU/ML
TSH SERPL-ACNC: 1.86 UIU/ML

## 2023-03-23 PROCEDURE — 76536 US EXAM OF HEAD AND NECK: CPT | Mod: 26

## 2023-03-23 PROCEDURE — 76536 US EXAM OF HEAD AND NECK: CPT

## 2023-03-27 ENCOUNTER — NON-APPOINTMENT (OUTPATIENT)
Age: 33
End: 2023-03-27

## 2023-03-28 ENCOUNTER — NON-APPOINTMENT (OUTPATIENT)
Age: 33
End: 2023-03-28

## 2023-07-03 ENCOUNTER — OFFICE (OUTPATIENT)
Dept: URBAN - METROPOLITAN AREA CLINIC 104 | Facility: CLINIC | Age: 33
Setting detail: OPHTHALMOLOGY
End: 2023-07-03
Payer: COMMERCIAL

## 2023-07-03 DIAGNOSIS — H52.03: ICD-10-CM

## 2023-07-03 DIAGNOSIS — H01.005: ICD-10-CM

## 2023-07-03 DIAGNOSIS — H01.002: ICD-10-CM

## 2023-07-03 PROBLEM — H52.223 ASTIGMATISM, REGULAR; BOTH EYES: Status: ACTIVE | Noted: 2023-07-03

## 2023-07-03 PROCEDURE — 92015 DETERMINE REFRACTIVE STATE: CPT | Performed by: SPECIALIST

## 2023-07-03 PROCEDURE — 99213 OFFICE O/P EST LOW 20 MIN: CPT | Performed by: SPECIALIST

## 2023-07-03 ASSESSMENT — LID EXAM ASSESSMENTS
OS_BLEPHARITIS: LLL 1+
OD_BLEPHARITIS: RLL 1+

## 2023-07-03 ASSESSMENT — CONFRONTATIONAL VISUAL FIELD TEST (CVF)
OS_FINDINGS: FULL
OD_FINDINGS: FULL

## 2023-07-05 ASSESSMENT — REFRACTION_CURRENTRX
OS_AXIS: 007
OD_OVR_VA: 20/
OS_SPHERE: +0.25
OD_CYLINDER: -3.25
OD_SPHERE: +0.25
OS_OVR_VA: 20/
OS_VPRISM_DIRECTION: SV
OD_AXIS: 169
OD_VPRISM_DIRECTION: SV
OS_CYLINDER: -3.25

## 2023-07-05 ASSESSMENT — REFRACTION_AUTOREFRACTION
OS_SPHERE: PLANO
OS_CYLINDER: -3.75
OD_AXIS: 174
OD_CYLINDER: -3.25
OS_AXIS: 010
OD_SPHERE: +0.25

## 2023-07-05 ASSESSMENT — VISUAL ACUITY
OS_BCVA: 20/25-1
OD_BCVA: 20/25-1

## 2023-07-05 ASSESSMENT — REFRACTION_MANIFEST
OD_AXIS: 175
OD_VA1: 20/20
OS_VA1: 20/20
OS_SPHERE: +0.25
OS_AXIS: 20
OS_CYLINDER: -3.50
OD_SPHERE: +0.25
OD_CYLINDER: -3.50

## 2023-07-05 ASSESSMENT — SPHEQUIV_DERIVED
OD_SPHEQUIV: -1.5
OD_SPHEQUIV: -1.375
OS_SPHEQUIV: -1.5

## 2023-08-22 LAB
T3 SERPL-MCNC: 124 NG/DL
T4 FREE SERPL-MCNC: 1.1 NG/DL
TSH SERPL-ACNC: 1.68 UIU/ML

## 2023-08-24 ENCOUNTER — APPOINTMENT (OUTPATIENT)
Dept: ENDOCRINOLOGY | Facility: CLINIC | Age: 33
End: 2023-08-24
Payer: MEDICAID

## 2023-08-24 VITALS
SYSTOLIC BLOOD PRESSURE: 111 MMHG | OXYGEN SATURATION: 98 % | HEART RATE: 93 BPM | WEIGHT: 166 LBS | HEIGHT: 60 IN | DIASTOLIC BLOOD PRESSURE: 62 MMHG | BODY MASS INDEX: 32.59 KG/M2

## 2023-08-24 PROCEDURE — 99214 OFFICE O/P EST MOD 30 MIN: CPT

## 2023-08-24 NOTE — HISTORY OF PRESENT ILLNESS
[FreeTextEntry1] : Yulia is a 32 yr old female, who  is here for follow up   of hypothyroidism. Hashimotos disease. She is Arabic speaking, we have  on video, Sourav, ID: [id number] Per patient she was diagnosed 2 years ago. Has been on replacement since then. Currently on levothyroxine  25 mcg daily, takes it empty stomach in morning, denies missing any doses.  It helped her some with body aches and pain, but not as much for anxiety and depression. Had initially lost weight, then gained 5 pounds, now again working on her diet. No family h/o thyroid disorder or cancer. No h/o neck radiation. No dysphagia, no SOB. Complains of  some cold intolerance,   some constipation, no palpitations, energy level fair, no skin or hair changes. Menstrual cycles regular. Has 1 kid, 11 yr old. A1c was 5.7% then 5.3%, her father has DM.

## 2023-08-24 NOTE — REVIEW OF SYSTEMS
[Fatigue] : fatigue [Decreased Appetite] : appetite not decreased [Recent Weight Gain (___ Lbs)] : no recent weight gain [Recent Weight Loss (___ Lbs)] : recent weight loss: [unfilled] lbs [Visual Field Defect] : no visual field defect [Dry Eyes] : no dryness [Chest Pain] : no chest pain [Palpitations] : no palpitations [Lower Ext Edema] : no lower extremity edema [Shortness Of Breath] : no shortness of breath [Cough] : no cough [Nausea] : no nausea [Constipation] : constipation [Abdominal Pain] : no abdominal pain [Vomiting] : no vomiting [Diarrhea] : no diarrhea [Polyuria] : no polyuria [Joint Pain] : no joint pain [Muscle Weakness] : no muscle weakness [Myalgia] : no myalgia  [Acanthosis] : no acanthosis  [Acne] : no acne [Dry Skin] : no dry skin [Headaches] : no headaches [Depression] : depression [Insomnia] : no insomnia [Anxiety] : anxiety [Polydipsia] : no polydipsia [Cold Intolerance] : cold intolerance [Heat Intolerance] : no heat intolerance [Easy Bleeding] : no ~M tendency for easy bleeding [Easy Bruising] : no tendency for easy bruising

## 2023-08-24 NOTE — PHYSICAL EXAM
[Alert] : alert [Well Nourished] : well nourished [No Acute Distress] : no acute distress [Normal Sclera/Conjunctiva] : normal sclera/conjunctiva [PERRL] : pupils equal, round and reactive to light [No Neck Mass] : no neck mass was observed [Supple] : the neck was supple [No Respiratory Distress] : no respiratory distress [No Accessory Muscle Use] : no accessory muscle use [Clear to Auscultation] : lungs were clear to auscultation bilaterally [Normal S1, S2] : normal S1 and S2 [Normal Rate] : heart rate was normal [Regular Rhythm] : with a regular rhythm [Normal Gait] : normal gait [No Rash] : no rash [No Tremors] : no tremors [Oriented x3] : oriented to person, place, and time [Normal Affect] : the affect was normal [Normal Insight/Judgement] : insight and judgment were intact [Normal Mood] : the mood was normal [de-identified] : neck fullness

## 2023-08-24 NOTE — ASSESSMENT
[FreeTextEntry1] : Yulia is a 32 yr old female, who  is here for follow up   of hypothyroidism. Hashimotos disease. She is Upper sorbian speaking, we have  on video, Sourav, ID: [id number] Per patient she was diagnosed 2 years ago. Has been on replacement since then.   Currently on levothyroxine  25 mcg daily.  Hypothyroidism: TSH normal this visitg, continue same dose for now Discussed with patient how to take thyroid medication appropriately,empty stomach , all Multi vitamins  4 to 6 hrs away form thyroid medication, he voiced understanding.   neck fullness: Got neck US.No nodules seen, no further workup at this point.  prediabetes:  a1c 5.3% diet and exercise discussed.  rtcin 6 months

## 2024-02-12 ENCOUNTER — OFFICE (OUTPATIENT)
Dept: URBAN - METROPOLITAN AREA CLINIC 104 | Facility: CLINIC | Age: 34
Setting detail: OPHTHALMOLOGY
End: 2024-02-12
Payer: COMMERCIAL

## 2024-02-12 DIAGNOSIS — H01.005: ICD-10-CM

## 2024-02-12 DIAGNOSIS — H52.223: ICD-10-CM

## 2024-02-12 DIAGNOSIS — H01.002: ICD-10-CM

## 2024-02-12 PROCEDURE — 92015 DETERMINE REFRACTIVE STATE: CPT | Performed by: SPECIALIST

## 2024-02-12 PROCEDURE — 92014 COMPRE OPH EXAM EST PT 1/>: CPT | Performed by: SPECIALIST

## 2024-02-12 ASSESSMENT — REFRACTION_CURRENTRX
OS_OVR_VA: 20/
OD_AXIS: 171
OD_OVR_VA: 20/
OD_SPHERE: +0.25
OS_SPHERE: +0.25
OS_AXIS: 019
OS_CYLINDER: -3.50
OD_CYLINDER: -3.75

## 2024-02-12 ASSESSMENT — REFRACTION_AUTOREFRACTION
OS_AXIS: 010
OD_CYLINDER: -3.00
OS_CYLINDER: -3.75
OD_SPHERE: +0.50
OS_SPHERE: +0.75
OD_AXIS: 171

## 2024-02-12 ASSESSMENT — CONFRONTATIONAL VISUAL FIELD TEST (CVF)
OD_FINDINGS: FULL
OS_FINDINGS: FULL

## 2024-02-12 ASSESSMENT — REFRACTION_MANIFEST
OS_VA1: 20/20
OD_AXIS: 170
OS_CYLINDER: -3.50
OD_VA1: 20/20
OD_CYLINDER: -3.25
OD_CYLINDER: -3.25
OS_CYLINDER: -3.50
OS_AXIS: 10
OD_SPHERE: +0.25
OD_AXIS: 170
OD_SPHERE: PLANO
OS_VA1: 20/20
OS_SPHERE: +0.50
OD_VA1: 20/20
OS_AXIS: 10
OS_SPHERE: +1.00

## 2024-02-12 ASSESSMENT — SPHEQUIV_DERIVED
OS_SPHEQUIV: -1.25
OD_SPHEQUIV: -1.375
OS_SPHEQUIV: -0.75
OD_SPHEQUIV: -1
OS_SPHEQUIV: -1.125

## 2024-02-12 ASSESSMENT — LID EXAM ASSESSMENTS
OS_BLEPHARITIS: LLL 1+
OD_BLEPHARITIS: RLL 1+

## 2024-03-20 ENCOUNTER — LABORATORY RESULT (OUTPATIENT)
Age: 34
End: 2024-03-20

## 2024-03-21 ENCOUNTER — APPOINTMENT (OUTPATIENT)
Dept: ENDOCRINOLOGY | Facility: CLINIC | Age: 34
End: 2024-03-21
Payer: COMMERCIAL

## 2024-03-21 VITALS
HEART RATE: 70 BPM | WEIGHT: 159 LBS | SYSTOLIC BLOOD PRESSURE: 100 MMHG | OXYGEN SATURATION: 96 % | DIASTOLIC BLOOD PRESSURE: 70 MMHG | BODY MASS INDEX: 31.22 KG/M2 | HEIGHT: 60 IN

## 2024-03-21 DIAGNOSIS — R73.03 PREDIABETES.: ICD-10-CM

## 2024-03-21 DIAGNOSIS — E03.9 HYPOTHYROIDISM, UNSPECIFIED: ICD-10-CM

## 2024-03-21 DIAGNOSIS — R22.1 LOCALIZED SWELLING, MASS AND LUMP, NECK: ICD-10-CM

## 2024-03-21 PROCEDURE — 99214 OFFICE O/P EST MOD 30 MIN: CPT

## 2024-03-21 RX ORDER — LEVOTHYROXINE SODIUM 0.03 MG/1
25 TABLET ORAL
Qty: 90 | Refills: 1 | Status: ACTIVE | COMMUNITY
Start: 2023-03-24 | End: 1900-01-01

## 2024-03-21 NOTE — PHYSICAL EXAM
[Alert] : alert [Well Nourished] : well nourished [No Acute Distress] : no acute distress [Normal Sclera/Conjunctiva] : normal sclera/conjunctiva [Supple] : the neck was supple [No Neck Mass] : no neck mass was observed [PERRL] : pupils equal, round and reactive to light [No Respiratory Distress] : no respiratory distress [No Accessory Muscle Use] : no accessory muscle use [Normal S1, S2] : normal S1 and S2 [Clear to Auscultation] : lungs were clear to auscultation bilaterally [Normal Rate] : heart rate was normal [Normal Gait] : normal gait [Regular Rhythm] : with a regular rhythm [No Tremors] : no tremors [Oriented x3] : oriented to person, place, and time [Normal Affect] : the affect was normal [Normal Insight/Judgement] : insight and judgment were intact [Normal Mood] : the mood was normal [de-identified] : neck fullness

## 2024-03-21 NOTE — REVIEW OF SYSTEMS
[Fatigue] : fatigue [Recent Weight Loss (___ Lbs)] : recent weight loss: [unfilled] lbs [Constipation] : constipation [Depression] : depression [Anxiety] : anxiety [Cold Intolerance] : cold intolerance [Recent Weight Gain (___ Lbs)] : no recent weight gain [Decreased Appetite] : appetite not decreased [Visual Field Defect] : no visual field defect [Dry Eyes] : no dryness [Chest Pain] : no chest pain [Palpitations] : no palpitations [Lower Ext Edema] : no lower extremity edema [Shortness Of Breath] : no shortness of breath [Cough] : no cough [Nausea] : no nausea [Abdominal Pain] : no abdominal pain [Vomiting] : no vomiting [Diarrhea] : no diarrhea [Polyuria] : no polyuria [Joint Pain] : no joint pain [Muscle Weakness] : no muscle weakness [Myalgia] : no myalgia  [Acanthosis] : no acanthosis  [Acne] : no acne [Dry Skin] : no dry skin [Headaches] : no headaches [Insomnia] : no insomnia [Heat Intolerance] : no heat intolerance [Polydipsia] : no polydipsia [Easy Bleeding] : no ~M tendency for easy bleeding [Easy Bruising] : no tendency for easy bruising

## 2024-03-21 NOTE — ASSESSMENT
[FreeTextEntry1] : Yulia is a 33 yr old female, who  is here for follow up   of hypothyroidism. Hashimotos disease. She is Yi speaking, we have  on video, Charmaine  ID: [id number]   Currently on levothyroxine  25 mcg daily.  Hypothyroidism: TSH normal 2.39 this visit , continue same dose for now Discussed with patient how to take thyroid medication appropriately,empty stomach , all Multi vitamins  4 to 6 hrs away form thyroid medication, he voiced understanding.   neck fullness: Got neck US in 3/23 .No nodules seen, no further workup at this point. Now complaining of  feeling of something stuck in throat, lot of phlem like feeling,  some sore throat, going on for some time,  complains of small amount of yellow foul smelling stuff coming out of her mouth. Her PCP sent her to us, will repeat neck US, but discussed with her that it does not sound like thyroid issue, looks like some throat infection, will refer her to ENT.  prediabetes:  a1c 5.7% in  1/23, 5.3% in  3/23 diet and exercise discussed.  rtc in 6 months

## 2024-03-21 NOTE — HISTORY OF PRESENT ILLNESS
[FreeTextEntry1] : Yulia is a 33 yr old female, who  is here for follow up   of hypothyroidism. Hashimotos disease. She is Kinyarwanda speaking, we have  on video, Charmaine  ID: [id number]  Per patient she was diagnosed 2 to 3  years ago. Has been on replacement since then. Currently on levothyroxine  25 mcg daily.  takes it empty stomach in morning, denies missing any doses.  Now complaining of  feeliing of something stuck in throat, lot of phlem like feeling,  some sore throat, going on for some time,  complains of small amount of yellow foul smelling stuff coming out of her mouth.   No family h/o thyroid disorder or cancer. No h/o neck radiation. No dysphagia, no SOB. Complains of  some cold intolerance,weight stable,   some constipation, no palpitations, energy level fair, no skin or hair changes. Menstrual cycles regular. Has 1 kid, 11 yr old. A1c was 5.7% then 5.3%, her father has DM.

## 2024-04-09 ENCOUNTER — APPOINTMENT (OUTPATIENT)
Dept: OTOLARYNGOLOGY | Facility: CLINIC | Age: 34
End: 2024-04-09

## 2024-04-15 ENCOUNTER — APPOINTMENT (OUTPATIENT)
Dept: OTOLARYNGOLOGY | Facility: CLINIC | Age: 34
End: 2024-04-15
Payer: COMMERCIAL

## 2024-04-15 VITALS
DIASTOLIC BLOOD PRESSURE: 76 MMHG | HEIGHT: 60 IN | HEART RATE: 75 BPM | WEIGHT: 159 LBS | SYSTOLIC BLOOD PRESSURE: 114 MMHG | BODY MASS INDEX: 31.22 KG/M2

## 2024-04-15 PROCEDURE — 99203 OFFICE O/P NEW LOW 30 MIN: CPT | Mod: 25

## 2024-04-15 PROCEDURE — 31575 DIAGNOSTIC LARYNGOSCOPY: CPT

## 2024-04-15 RX ORDER — ELECTROLYTES/DEXTROSE
SOLUTION, ORAL ORAL
Refills: 0 | Status: ACTIVE | COMMUNITY

## 2024-04-15 NOTE — PROCEDURE
[de-identified] : Laryngoscopy: Nasopharynx clear without masses Base of tongue without masses or lesions Vallecula clear Pyriforms clear Epiglottis crisp TVFs mobile bilaterally Arytenoids normal Glottic airway patent Interarytenoid edema Postcricoid edema

## 2024-04-15 NOTE — CONSULT LETTER
[Dear  ___] : Dear  [unfilled], [Courtesy Letter:] : I had the pleasure of seeing your patient, [unfilled], in my office today. [Please see my note below.] : Please see my note below. [Sincerely,] : Sincerely, [FreeTextEntry2] : Dr. Michelle Raya [FreeTextEntry3] : Jorje Baldwin MD Otolaryngology at [address], [address] [place] [address] Phone: [phone] Fax: [phone]

## 2024-04-15 NOTE — REASON FOR VISIT
[Initial Evaluation] : an initial evaluation for [[employer] ] : provided by [employer]   [FreeTextEntry2] : throat pain [Interpreters_IDNumber] : [id number] [Interpreters_FullName] : Georgina [TWNoteComboBox1] : Gabonese

## 2024-04-15 NOTE — ASSESSMENT
[FreeTextEntry1] : Globus from LPR and reflux disease.  GI consultation. Start omeprazole. Reflux precautions and behavioral modifications were discussed with patient including weight loss, the avoidance of caffeine, alcohol, tobacco and spicy food ingestion, avoidance of eating within 3 hours of retiring for sleep, and elevation of the head of the bed.  F/u 1-2 months. Seek attention for hemoptysis, bleeding, dysphagia, SOB/difficulty breathing, significant cough, voice change greater than 2 weeks, throat pain, fever, chills, neck swelling, redness of neck.

## 2024-04-15 NOTE — PHYSICAL EXAM
[de-identified] : mild congestion [Normal] : mucosa is normal [Midline] : trachea located in midline position [Laryngoscopy Performed] : laryngoscopy was performed, see procedure section for findings

## 2024-04-15 NOTE — HISTORY OF PRESENT ILLNESS
[de-identified] : 33 year old female here for throat pain, referred by endocrinologist Dr. Mittal for potential throat infection. Patient reports globus sensation with intermittently worsening dysphagia, odynophagia for 4 months. Pt denies dyspnea, dysphonia or otalgia. Denies use of over the counter antacids or reflux medications. Denies history of smoking and alcohol use.  Has heartburn.

## 2024-06-12 ENCOUNTER — APPOINTMENT (OUTPATIENT)
Dept: OTOLARYNGOLOGY | Facility: CLINIC | Age: 34
End: 2024-06-12
Payer: COMMERCIAL

## 2024-06-12 VITALS
BODY MASS INDEX: 31.41 KG/M2 | HEART RATE: 83 BPM | WEIGHT: 160 LBS | HEIGHT: 60 IN | DIASTOLIC BLOOD PRESSURE: 80 MMHG | SYSTOLIC BLOOD PRESSURE: 126 MMHG

## 2024-06-12 DIAGNOSIS — K21.9 GASTRO-ESOPHAGEAL REFLUX DISEASE W/OUT ESOPHAGITIS: ICD-10-CM

## 2024-06-12 DIAGNOSIS — R09.A2 FOREIGN BODY SENSATION, THROAT: ICD-10-CM

## 2024-06-12 PROCEDURE — 99213 OFFICE O/P EST LOW 20 MIN: CPT

## 2024-06-12 RX ORDER — PANTOPRAZOLE 40 MG/1
40 TABLET, DELAYED RELEASE ORAL DAILY
Qty: 30 | Refills: 3 | Status: COMPLETED | COMMUNITY
Start: 2024-04-15 | End: 2024-06-12

## 2024-06-12 RX ORDER — FLUCONAZOLE 150 MG/1
TABLET ORAL
Refills: 0 | Status: ACTIVE | COMMUNITY

## 2024-06-12 RX ORDER — CLOTRIMAZOLE 10 MG/G
1 CREAM TOPICAL
Refills: 0 | Status: ACTIVE | COMMUNITY

## 2024-06-12 NOTE — HISTORY OF PRESENT ILLNESS
[de-identified] : 33 year old female here for followup globus sensation for months with halitosis, dysphagia. Patient denies recurrent tonsillitis, cryptic tonsils, odynophagia, dysphonia. Did not see GI, did not take omeprazole.  Right ear is muffled and hurts sometimes.

## 2024-06-12 NOTE — REASON FOR VISIT
[Initial Evaluation] : an initial evaluation for [[employer] ] : provided by [employer]   [FreeTextEntry2] : tonsil stones [Interpreters_IDNumber] : [id number] [Interpreters_FullName] : Negro [TWNoteComboBox1] : Angolan

## 2024-06-12 NOTE — ASSESSMENT
[FreeTextEntry1] : Patient did not follow any of the prior recommendations. Counseled on need to follow recommendations to avoid delay in diagnosis.  GI consultation.  Encouraged to fill and start PPI.  Reflux precautions and behavioral modifications were discussed with patient including weight loss, the avoidance of tobacco as well as mint, chocolate, caffeine, alcohol, citrus, tomato based and spicy food ingestion, avoidance of eating within 3-4 hours of retiring for sleep, and elevation of the head of the bed.  Check Audiogram to rule out hearing loss.  Seek attention for hemoptysis, bleeding, dysphagia, SOB/difficulty breathing, significant cough, voice change greater than 2 weeks, throat pain, fever, chills, neck swelling, redness of neck.  Followup 2 months

## 2024-06-12 NOTE — CONSULT LETTER
[Dear  ___] : Dear  [unfilled], [Courtesy Letter:] : I had the pleasure of seeing your patient, [unfilled], in my office today. [Please see my note below.] : Please see my note below. [Sincerely,] : Sincerely, [FreeTextEntry2] : Dr. Michelle Eubanks [FreeTextEntry3] : Jorje Baldwin MD Otolaryngology at [address], [address] [place] [address] Phone: [phone] Fax: [phone]

## 2024-08-14 ENCOUNTER — APPOINTMENT (OUTPATIENT)
Dept: OTOLARYNGOLOGY | Facility: CLINIC | Age: 34
End: 2024-08-14
Payer: COMMERCIAL

## 2024-08-14 DIAGNOSIS — K21.9 GASTRO-ESOPHAGEAL REFLUX DISEASE W/OUT ESOPHAGITIS: ICD-10-CM

## 2024-08-14 DIAGNOSIS — R09.A2 FOREIGN BODY SENSATION, THROAT: ICD-10-CM

## 2024-08-14 PROCEDURE — 99213 OFFICE O/P EST LOW 20 MIN: CPT

## 2024-08-14 NOTE — REASON FOR VISIT
[Initial Evaluation] : an initial evaluation for [[employer] ] : provided by [employer]   [FreeTextEntry2] : tonsil stones [Interpreters_IDNumber] : [id number] [Interpreters_FullName] : [place] [TWNoteComboBox1] : [place] citizen

## 2024-08-14 NOTE — ASSESSMENT
[FreeTextEntry1] : Symptoms of throat and ear resolved.  She is scheduled to see GI. I recommended she continue PPI and reflux precautions for now. Re-recommended Audiogram.  Seek attention for hemoptysis, bleeding, dysphagia, SOB/difficulty breathing, significant cough, voice change greater than 2 weeks, throat pain, fever, chills, neck swelling, redness of neck.  Followup 2 months

## 2024-08-14 NOTE — CONSULT LETTER
[Dear  ___] : Dear  [unfilled], [Courtesy Letter:] : I had the pleasure of seeing your patient, [unfilled], in my office today. [Please see my note below.] : Please see my note below. [Sincerely,] : Sincerely, [FreeTextEntry2] : Dr. Michelle Eubanks [FreeTextEntry3] : Jorje Baldwin MD Otolaryngology at [address], [address] [place] [address] Phone: [phone] Fax: [phone]

## 2024-08-14 NOTE — HISTORY OF PRESENT ILLNESS
[de-identified] : 33F year old female here for follow up globus sensation. Patient reports improvement in globus with no further complaints. Patient has appointment with GI doctor 9/3/24. Patient has not yet obtained audiogram, says ear symptoms have resolved.

## 2024-09-02 DIAGNOSIS — K21.9 GASTRO-ESOPHAGEAL REFLUX DISEASE W/OUT ESOPHAGITIS: ICD-10-CM

## 2024-09-03 ENCOUNTER — APPOINTMENT (OUTPATIENT)
Dept: GASTROENTEROLOGY | Facility: CLINIC | Age: 34
End: 2024-09-03

## 2024-09-16 ENCOUNTER — LABORATORY RESULT (OUTPATIENT)
Age: 34
End: 2024-09-16

## 2024-09-18 ENCOUNTER — APPOINTMENT (OUTPATIENT)
Dept: ENDOCRINOLOGY | Facility: CLINIC | Age: 34
End: 2024-09-18
Payer: COMMERCIAL

## 2024-09-18 VITALS
HEART RATE: 78 BPM | HEIGHT: 60 IN | SYSTOLIC BLOOD PRESSURE: 118 MMHG | OXYGEN SATURATION: 98 % | BODY MASS INDEX: 30.43 KG/M2 | DIASTOLIC BLOOD PRESSURE: 62 MMHG | WEIGHT: 155 LBS

## 2024-09-18 DIAGNOSIS — R73.03 PREDIABETES.: ICD-10-CM

## 2024-09-18 DIAGNOSIS — E03.9 HYPOTHYROIDISM, UNSPECIFIED: ICD-10-CM

## 2024-09-18 PROCEDURE — 99214 OFFICE O/P EST MOD 30 MIN: CPT

## 2024-09-18 NOTE — ASSESSMENT
[FreeTextEntry1] : Yulia is a 33 yr old female, who  is here for follow up   of hypothyroidism. Hashimotos disease. She is Citizen of [place] speaking, we have  Chantal ID: [id number].  She tells me she is 6 weeks pregnant. Currently on levothyroxine  25 mcg daily.  Hypothyroidism: TSH normal 3.03  this visit, however goal for pregnancy is below 2.5. so will go up on dose slightly, to take 1 pill for 6 days and 2 pills on 1 day a week. repeat labs every 4 to 6 weeks during pregnancy. Discussed with patient how to take thyroid medication appropriately,empty stomach , all Multi vitamins  4 to 6 hrs away form thyroid medication, he voiced understanding.  Education: When hypothyroid women become pregnant and maintain the pregnancy, they carry an increased risk for early and late obstetrical complications to include Maternal infertility, miscarriage, anemia in pregnancy, preeclampsia, abruptio placenta and postpartum hemorrhage and in fetal; increased fetal death rate,  birth, low birth weight, increased  respiratory distress and mpaired neurointellectual child development.   Adequate treatment with thyroid hormone greatly reduces risks of a poorer obstetrical outcome. The adequacy of thyroxine treatment is critical to the outcome and adequate treatment of overt and subclinical hypothyroidism minimizes the risks of  and premature delivery. In hypothyroid pregnant women receiving adequate treatment, the frequency of abortions is minimal and pregnancies are carried to term without complications .  In euthyroid thyroid antibody positive pregnant women who were treated with thyroxine the rates of miscarriage and pre-term delivery are lower than euthyroid antibody positive women who did not receive thyroxine treatment.  Administration of L-thyroxine is the treatment of choice for maternal hypothyroidism,  A number of studies have indicated that during pregnancy thyroxine requirements increase during gestation. Women who already take thyroxine before pregnancy usually need to increase their daily dosage by 30-50%, on average, above preconception dosage.  To avoid the risk of maternal hypothyroidism during early gestation it has been suggested to adjust the preconception thyroxine dose with the aim to maintain serum TSH near the low-normal range. The use of levothyroxine in subclinical hypothyroidism is may reduce  birth and miscarriage.  prediabetes:  a1c 5.7% in  , 5.3% in  3/23, 5% in  diet and exercise discussed.  rtc in 4 to 6 weeks with NP, 3 to 4 months with me

## 2024-09-18 NOTE — HISTORY OF PRESENT ILLNESS
[FreeTextEntry1] : Yulia is a 33 yr old female, who  is here for follow up   of hypothyroidism. Hashimotos disease. She is [place] speaking, we have  Chantal ID: [id number].  She tells me she is 6 weeks pregnant. This is her second pregnancy. Has 13 year old child. Did not have thyroid issues back then.  Per patient she was diagnosed 2 to 3  years ago. Has been on replacement since then. Currently on levothyroxine  25 mcg daily.  takes it empty stomach in morning, denies missing any doses.  Feels tired, otherwise fine, no n/v. No weight gain yet.   No family h/o thyroid disorder or cancer. No h/o neck radiation. No dysphagia, no SOB. Complains of  some cold intolerance,weight stable,   some constipation, no palpitations, no skin or hair changes. Menstrual cycles regular.  A1c was 5.7% then 5.3%, her father has DM.

## 2024-09-18 NOTE — REVIEW OF SYSTEMS
[Fatigue] : fatigue [Recent Weight Loss (___ Lbs)] : recent weight loss: [unfilled] lbs [Constipation] : constipation [Depression] : depression [Anxiety] : anxiety [Cold Intolerance] : cold intolerance [Decreased Appetite] : appetite not decreased [Recent Weight Gain (___ Lbs)] : no recent weight gain [Visual Field Defect] : no visual field defect [Dry Eyes] : no dryness [Chest Pain] : no chest pain [Palpitations] : no palpitations [Lower Ext Edema] : no lower extremity edema [Shortness Of Breath] : no shortness of breath [Cough] : no cough [Nausea] : no nausea [Abdominal Pain] : no abdominal pain [Vomiting] : no vomiting [Diarrhea] : no diarrhea [Polyuria] : no polyuria [Joint Pain] : no joint pain [Muscle Weakness] : no muscle weakness [Myalgia] : no myalgia  [Acanthosis] : no acanthosis  [Acne] : no acne [Dry Skin] : no dry skin [Headaches] : no headaches [Insomnia] : no insomnia [Polydipsia] : no polydipsia [Heat Intolerance] : no heat intolerance [Easy Bleeding] : no ~M tendency for easy bleeding [Easy Bruising] : no tendency for easy bruising

## 2024-09-18 NOTE — PHYSICAL EXAM
[Alert] : alert [Well Nourished] : well nourished [No Acute Distress] : no acute distress [Normal Sclera/Conjunctiva] : normal sclera/conjunctiva [PERRL] : pupils equal, round and reactive to light [No Neck Mass] : no neck mass was observed [No Respiratory Distress] : no respiratory distress [No Accessory Muscle Use] : no accessory muscle use [Clear to Auscultation] : lungs were clear to auscultation bilaterally [Normal S1, S2] : normal S1 and S2 [Normal Rate] : heart rate was normal [Regular Rhythm] : with a regular rhythm [Normal Gait] : normal gait [No Tremors] : no tremors [Oriented x3] : oriented to person, place, and time [de-identified] : neck fullness

## 2024-10-15 ENCOUNTER — LABORATORY RESULT (OUTPATIENT)
Age: 34
End: 2024-10-15

## 2024-10-16 ENCOUNTER — APPOINTMENT (OUTPATIENT)
Dept: OTOLARYNGOLOGY | Facility: CLINIC | Age: 34
End: 2024-10-16

## 2024-10-18 ENCOUNTER — APPOINTMENT (OUTPATIENT)
Dept: ENDOCRINOLOGY | Facility: CLINIC | Age: 34
End: 2024-10-18
Payer: COMMERCIAL

## 2024-10-18 VITALS
DIASTOLIC BLOOD PRESSURE: 60 MMHG | SYSTOLIC BLOOD PRESSURE: 114 MMHG | WEIGHT: 156 LBS | HEART RATE: 68 BPM | OXYGEN SATURATION: 99 % | BODY MASS INDEX: 30.63 KG/M2 | HEIGHT: 60 IN

## 2024-10-18 DIAGNOSIS — E03.9 HYPOTHYROIDISM, UNSPECIFIED: ICD-10-CM

## 2024-10-18 DIAGNOSIS — R73.03 PREDIABETES.: ICD-10-CM

## 2024-10-18 PROCEDURE — G2211 COMPLEX E/M VISIT ADD ON: CPT | Mod: NC

## 2024-10-18 PROCEDURE — 99214 OFFICE O/P EST MOD 30 MIN: CPT

## 2024-11-11 ENCOUNTER — RX RENEWAL (OUTPATIENT)
Age: 34
End: 2024-11-11

## 2024-11-12 ENCOUNTER — LABORATORY RESULT (OUTPATIENT)
Age: 34
End: 2024-11-12

## 2024-11-15 ENCOUNTER — APPOINTMENT (OUTPATIENT)
Dept: ENDOCRINOLOGY | Facility: CLINIC | Age: 34
End: 2024-11-15

## 2025-01-14 ENCOUNTER — APPOINTMENT (OUTPATIENT)
Dept: ENDOCRINOLOGY | Facility: CLINIC | Age: 35
End: 2025-01-14
Payer: COMMERCIAL

## 2025-01-14 VITALS
WEIGHT: 158 LBS | BODY MASS INDEX: 31.02 KG/M2 | OXYGEN SATURATION: 98 % | SYSTOLIC BLOOD PRESSURE: 100 MMHG | HEIGHT: 60 IN | DIASTOLIC BLOOD PRESSURE: 50 MMHG | HEART RATE: 96 BPM

## 2025-01-14 DIAGNOSIS — E03.9 HYPOTHYROIDISM, UNSPECIFIED: ICD-10-CM

## 2025-01-14 PROCEDURE — 99214 OFFICE O/P EST MOD 30 MIN: CPT

## 2025-08-25 ENCOUNTER — APPOINTMENT (OUTPATIENT)
Dept: ENDOCRINOLOGY | Facility: CLINIC | Age: 35
End: 2025-08-25